# Patient Record
Sex: MALE | Race: WHITE | NOT HISPANIC OR LATINO | Employment: OTHER | URBAN - METROPOLITAN AREA
[De-identification: names, ages, dates, MRNs, and addresses within clinical notes are randomized per-mention and may not be internally consistent; named-entity substitution may affect disease eponyms.]

---

## 2017-10-09 ENCOUNTER — HOSPITAL ENCOUNTER (OUTPATIENT)
Facility: MEDICAL CENTER | Age: 52
End: 2017-10-09
Attending: SURGERY | Admitting: SURGERY
Payer: COMMERCIAL

## 2017-10-09 VITALS
SYSTOLIC BLOOD PRESSURE: 159 MMHG | BODY MASS INDEX: 37.78 KG/M2 | DIASTOLIC BLOOD PRESSURE: 71 MMHG | OXYGEN SATURATION: 99 % | HEART RATE: 72 BPM | WEIGHT: 285.06 LBS | RESPIRATION RATE: 17 BRPM | TEMPERATURE: 99 F | HEIGHT: 73 IN

## 2017-10-09 PROBLEM — N18.6 END STAGE RENAL DISEASE (HCC): Status: ACTIVE | Noted: 2017-10-09

## 2017-10-09 LAB
ANION GAP SERPL CALC-SCNC: 14 MMOL/L (ref 0–11.9)
BUN SERPL-MCNC: 80 MG/DL (ref 8–22)
CALCIUM SERPL-MCNC: 8.9 MG/DL (ref 8.5–10.5)
CHLORIDE SERPL-SCNC: 98 MMOL/L (ref 96–112)
CO2 SERPL-SCNC: 26 MMOL/L (ref 20–33)
CREAT SERPL-MCNC: 10.84 MG/DL (ref 0.5–1.4)
EKG IMPRESSION: NORMAL
ERYTHROCYTE [DISTWIDTH] IN BLOOD BY AUTOMATED COUNT: 51.4 FL (ref 35.9–50)
GFR SERPL CREATININE-BSD FRML MDRD: 5 ML/MIN/1.73 M 2
GLUCOSE BLD-MCNC: 189 MG/DL (ref 65–99)
GLUCOSE SERPL-MCNC: 173 MG/DL (ref 65–99)
HCT VFR BLD AUTO: 39.3 % (ref 42–52)
HGB BLD-MCNC: 13.4 G/DL (ref 14–18)
MCH RBC QN AUTO: 35 PG (ref 27–33)
MCHC RBC AUTO-ENTMCNC: 34.1 G/DL (ref 33.7–35.3)
MCV RBC AUTO: 102.6 FL (ref 81.4–97.8)
PLATELET # BLD AUTO: 162 K/UL (ref 164–446)
PMV BLD AUTO: 9.1 FL (ref 9–12.9)
POTASSIUM SERPL-SCNC: 5.1 MMOL/L (ref 3.6–5.5)
RBC # BLD AUTO: 3.83 M/UL (ref 4.7–6.1)
SODIUM SERPL-SCNC: 138 MMOL/L (ref 135–145)
WBC # BLD AUTO: 4.3 K/UL (ref 4.8–10.8)

## 2017-10-09 PROCEDURE — 93010 ELECTROCARDIOGRAM REPORT: CPT | Performed by: INTERNAL MEDICINE

## 2017-10-09 PROCEDURE — 501577 HCHG TROCAR, STEP 11MM: Performed by: SURGERY

## 2017-10-09 PROCEDURE — 82962 GLUCOSE BLOOD TEST: CPT

## 2017-10-09 PROCEDURE — A6402 STERILE GAUZE <= 16 SQ IN: HCPCS | Performed by: SURGERY

## 2017-10-09 PROCEDURE — A4300 CATH IMPL VASC ACCESS PORTAL: HCPCS | Performed by: SURGERY

## 2017-10-09 PROCEDURE — 93005 ELECTROCARDIOGRAM TRACING: CPT | Performed by: SURGERY

## 2017-10-09 PROCEDURE — 160036 HCHG PACU - EA ADDL 30 MINS PHASE I: Performed by: SURGERY

## 2017-10-09 PROCEDURE — 85027 COMPLETE CBC AUTOMATED: CPT

## 2017-10-09 PROCEDURE — 160025 RECOVERY II MINUTES (STATS): Performed by: SURGERY

## 2017-10-09 PROCEDURE — 501570 HCHG TROCAR, SEPARATOR: Performed by: SURGERY

## 2017-10-09 PROCEDURE — 160035 HCHG PACU - 1ST 60 MINS PHASE I: Performed by: SURGERY

## 2017-10-09 PROCEDURE — 700102 HCHG RX REV CODE 250 W/ 637 OVERRIDE(OP)

## 2017-10-09 PROCEDURE — 501327 HCHG SET, CAPD TRANSFER: Performed by: SURGERY

## 2017-10-09 PROCEDURE — 160047 HCHG PACU  - EA ADDL 30 MINS PHASE II: Performed by: SURGERY

## 2017-10-09 PROCEDURE — 160002 HCHG RECOVERY MINUTES (STAT): Performed by: SURGERY

## 2017-10-09 PROCEDURE — 160028 HCHG SURGERY MINUTES - 1ST 30 MINS LEVEL 3: Performed by: SURGERY

## 2017-10-09 PROCEDURE — 160046 HCHG PACU - 1ST 60 MINS PHASE II: Performed by: SURGERY

## 2017-10-09 PROCEDURE — 160048 HCHG OR STATISTICAL LEVEL 1-5: Performed by: SURGERY

## 2017-10-09 PROCEDURE — 501838 HCHG SUTURE GENERAL: Performed by: SURGERY

## 2017-10-09 PROCEDURE — 700111 HCHG RX REV CODE 636 W/ 250 OVERRIDE (IP)

## 2017-10-09 PROCEDURE — 80048 BASIC METABOLIC PNL TOTAL CA: CPT

## 2017-10-09 PROCEDURE — 160009 HCHG ANES TIME/MIN: Performed by: SURGERY

## 2017-10-09 PROCEDURE — A9270 NON-COVERED ITEM OR SERVICE: HCPCS

## 2017-10-09 PROCEDURE — 700101 HCHG RX REV CODE 250

## 2017-10-09 DEVICE — IMPLANTABLE DEVICE: Type: IMPLANTABLE DEVICE | Status: FUNCTIONAL

## 2017-10-09 RX ORDER — CARVEDILOL 12.5 MG/1
12.5 TABLET ORAL SEE ADMIN INSTRUCTIONS
COMMUNITY

## 2017-10-09 RX ORDER — GABAPENTIN 300 MG/1
300 CAPSULE ORAL DAILY
COMMUNITY

## 2017-10-09 RX ORDER — FUROSEMIDE 20 MG/1
20 TABLET ORAL 2 TIMES DAILY
COMMUNITY

## 2017-10-09 RX ORDER — FLUOXETINE HYDROCHLORIDE 40 MG/1
40 CAPSULE ORAL DAILY
COMMUNITY

## 2017-10-09 RX ORDER — INSULIN GLARGINE 100 [IU]/ML
48 INJECTION, SOLUTION SUBCUTANEOUS NIGHTLY
COMMUNITY

## 2017-10-09 RX ORDER — BUPIVACAINE HYDROCHLORIDE AND EPINEPHRINE 5; 5 MG/ML; UG/ML
INJECTION, SOLUTION EPIDURAL; INTRACAUDAL; PERINEURAL
Status: DISCONTINUED | OUTPATIENT
Start: 2017-10-09 | End: 2017-10-09 | Stop reason: HOSPADM

## 2017-10-09 RX ORDER — OXYCODONE HCL 5 MG/5 ML
SOLUTION, ORAL ORAL
Status: COMPLETED
Start: 2017-10-09 | End: 2017-10-09

## 2017-10-09 RX ORDER — LISINOPRIL 10 MG/1
10 TABLET ORAL SEE ADMIN INSTRUCTIONS
COMMUNITY

## 2017-10-09 RX ORDER — LIDOCAINE AND PRILOCAINE 25; 25 MG/G; MG/G
1 CREAM TOPICAL
Status: DISCONTINUED | OUTPATIENT
Start: 2017-10-09 | End: 2017-10-09 | Stop reason: HOSPADM

## 2017-10-09 RX ORDER — HYDROCODONE BITARTRATE AND ACETAMINOPHEN 5; 325 MG/1; MG/1
1-2 TABLET ORAL EVERY 4 HOURS PRN
Qty: 30 TAB | Refills: 0 | Status: SHIPPED | OUTPATIENT
Start: 2017-10-09

## 2017-10-09 RX ORDER — MAGNESIUM HYDROXIDE 1200 MG/15ML
LIQUID ORAL
Status: DISCONTINUED | OUTPATIENT
Start: 2017-10-09 | End: 2017-10-09 | Stop reason: HOSPADM

## 2017-10-09 RX ORDER — LORAZEPAM 1 MG/1
1 TABLET ORAL
COMMUNITY

## 2017-10-09 RX ORDER — LIDOCAINE HYDROCHLORIDE 10 MG/ML
0.5 INJECTION, SOLUTION INFILTRATION; PERINEURAL
Status: DISCONTINUED | OUTPATIENT
Start: 2017-10-09 | End: 2017-10-09 | Stop reason: HOSPADM

## 2017-10-09 RX ORDER — SODIUM CHLORIDE 9 MG/ML
1000 INJECTION, SOLUTION INTRAVENOUS
Status: DISCONTINUED | OUTPATIENT
Start: 2017-10-09 | End: 2017-10-09 | Stop reason: HOSPADM

## 2017-10-09 RX ADMIN — OXYCODONE HYDROCHLORIDE 1 MG: 5 SOLUTION ORAL at 09:40

## 2017-10-09 ASSESSMENT — PAIN SCALES - GENERAL
PAINLEVEL_OUTOF10: 5
PAINLEVEL_OUTOF10: 5
PAINLEVEL_OUTOF10: 6
PAINLEVEL_OUTOF10: 0
PAINLEVEL_OUTOF10: 6
PAINLEVEL_OUTOF10: 5
PAINLEVEL_OUTOF10: 6
PAINLEVEL_OUTOF10: 5
PAINLEVEL_OUTOF10: 0
PAINLEVEL_OUTOF10: 6
PAINLEVEL_OUTOF10: 0

## 2017-10-09 NOTE — OP REPORT
10/09/17    OPERATIVE NOTE    PRE-OPERATIVE DIAGNOSIS - Renal Failuire    POST-OPERATIVE DIAGNOSIS - Same    PROCEDURE PERFORMED - Laparoscopic Peritoneal Dialysis Catheter Placement     SURGEON - Ron Duong M.D.    ASSISTANT - PHILIP Pressley    TYPE OF ANESTHESIA - General     ANESTHESIOLOGIST  - Giovanny SLOAN - none    INDICATIONS - 52 y.o.      PROCEDURE IN DETAIL -the patient was taken to the operative suite placed in the supine position and adequate anesthesia the patient's abdomen was prepped and draped in sterile fashion. Half percent Marcaine with epinephrine was infiltrated into the subcutaneous tissue. Subcostal space. A small incision was made and a varies needle was inserted into the peritoneal cavity. CO2 was insufflated to a pressure of 15 mmHg and a 5 mm trocar was inserted. General inspection of the peritoneal cavity demonstrated no intra-abdominal adhesions. Half percent Marcaine was then infiltrated into the left mid abdomen and a small incision was made. A 11 mm trocar was tunneled subcutaneously and then entered into the peritoneal cavity oriented toward the pelvis. The 57 cm CAPD catheter was then laparoscopically advanced into the peritoneal cavity was properly positioned deep in the pelvis. The trocar was slowly withdrawn leaving the catheter in place. 4 Monocryl was then used to reapproximate the skin around the catheter. The 5 mm trocar was removed and that incision was closed using 4-0 Monocryl as well. Sterile dressings were applied and the catheter was flushed with dilute heparinized saline.      Ron Duong M.D.

## 2017-10-09 NOTE — DISCHARGE INSTRUCTIONS
ACTIVITY: Rest and take it easy for the first 24 hours.  A responsible adult is recommended to remain with you during that time.  It is normal to feel sleepy.  We encourage you to not do anything that requires balance, judgment or coordination.    MILD FLU-LIKE SYMPTOMS ARE NORMAL. YOU MAY EXPERIENCE GENERALIZED MUSCLE ACHES, THROAT IRRITATION, HEADACHE AND/OR SOME NAUSEA.    FOR 24 HOURS DO NOT:  Drive, operate machinery or run household appliances.  Drink beer or alcoholic beverages.   Make important decisions or sign legal documents.    SPECIAL INSTRUCTIONS: D/C instructions:     1. DIET: Upon discharge from the hospital you may resume your normal preoperative diet. Depending on how you are feeling and whether you have nausea or not, you may wish to stay with a bland diet for the first few days. However, you can advance this as quickly as you feel ready.     2. ACTIVITIES: After discharge from the hospital, you may resume full routine activities. However, there should be no heavy lifting (greater than 15 pounds) and no strenuous activities until after your follow-up visit. Otherwise, routine activities of daily living are acceptable.     3. DRIVING: You may drive whenever you are off pain medications and are able to perform the activities needed to drive, i.e. turning, bending, twisting, etc.     4. BATHING: You may get the wound wet at any time after leaving the hospital. You may shower, but do not submerge in a bath for at least a week. Dressings may come off after 48 hours. Leave steri strips on until they fall off.  It may be necessary to trim the edges.     5. BOWEL FUNCTION: Constipation is common after an operation, especially with pain medications. The combination of pain medication and decreased activity level can cause constipation in otherwise normal patients. If you feel this is occurring, take a laxative (Milk of Magnesia, Ex-Lax, Senokot, etc.) until the problem has resolved.     6. PAIN  MEDICATION: You will be given a prescription for pain medication at discharge. Please take these as directed. It is important to remember not to take medications on an empty stomach as this may cause nausea.     7.CALL IF YOU HAVE: (1) Fevers to more than 101.0 F, (2) Unusual chest or leg pain, (3) Drainage or fluid from incision that may be foul smelling, increased tenderness or soreness at the wound or the wound edges are no longer together, redness or swelling at the incision site. Please do not hesitate to call with any other questions.     8. APPOINTMENT: Contact [facility] for follow up     If you have any additional questions, please do not hesitate to call the office and speak to either myself or the physician on call.     Office address:   [address] [place], [place] [address]e [facility] [id number]   Ron Duong MD   [facility]   [phone]    DIET: To avoid nausea, slowly advance diet as tolerated, avoiding spicy or greasy foods for the first day.  Add more substantial food to your diet according to your physician's instructions.  Babies can be fed formula or breast milk as soon as they are hungry.  INCREASE FLUIDS AND FIBER TO AVOID CONSTIPATION.    SURGICAL DRESSING/BATHING: *see above*    FOLLOW-UP APPOINTMENT:  A follow-up appointment should be arranged with your doctor; call to schedule.    You should CALL YOUR PHYSICIAN if you develop:  Fever greater than 101 degrees F.  Pain not relieved by medication, or persistent nausea or vomiting.  Excessive bleeding (blood soaking through dressing) or unexpected drainage from the wound.  Extreme redness or swelling around the incision site, drainage of pus or foul smelling drainage.  Inability to urinate or empty your bladder within 8 hours.  Problems with breathing or chest pain.    You should call 911 if you develop problems with breathing or chest pain.  If you are unable to contact your doctor or surgical center, you should go to the nearest emergency room or urgent care  center.  Physician's telephone #: *Dr. Duong [phone]*    If any questions arise, call your doctor.  If your doctor is not available, please feel free to call the Surgical Center at [phone].  The Center is open Monday through Friday from 7AM to 7PM.  You can also call the HEALTH HOTLINE open 24 hours/day, 7 days/week and speak to a nurse at [phone], or toll free at [phone].    A registered nurse may call you a few days after your surgery to see how you are doing after your procedure.    MEDICATIONS: Resume taking daily medication.  Take prescribed pain medication with food.  If no medication is prescribed, you may take non-aspirin pain medication if needed.  PAIN MEDICATION CAN BE VERY CONSTIPATING.  Take a stool softener or laxative such as senokot, pericolace, or milk of magnesia if needed.    Prescription given *to wife *.  Last pain medication given at *410**.    If your physician has prescribed pain medication that includes Acetaminophen (Tylenol), do not take additional Acetaminophen (Tylenol) while taking the prescribed medication.    Depression / Suicide Risk    As you are discharged from this [facility] facility, it is important to learn how to keep safe from harming yourself.    Recognize the warning signs:  · Abrupt changes in personality, positive or negative- including increase in energy   · Giving away possessions  · Change in eating patterns- significant weight changes-  positive or negative  · Change in sleeping patterns- unable to sleep or sleeping all the time   · Unwillingness or inability to communicate  · Depression  · Unusual sadness, discouragement and loneliness  · Talk of wanting to die  · Neglect of personal appearance   · Rebelliousness- reckless behavior  · Withdrawal from people/activities they love  · Confusion- inability to concentrate     If you or a loved one observes any of these behaviors or has concerns about self-harm, here's what you can do:  · Talk  about it- your feelings and reasons for harming yourself  · Remove any means that you might use to hurt yourself (examples: pills, rope, extension cords, firearm)  · Get professional help from the community (Mental Health, Substance Abuse, psychological counseling)  · Do not be alone:Call your Safe Contact- someone whom you trust who will be there for you.  · Call your local CRISIS HOTLINE 521-5871 or 869-995-8921  · Call your local Children's Mobile Crisis Response Team [place] (520) 397-5536 or wwwSocialWire  · Call the toll free National Suicide Prevention Hotlines   · National Suicide Prevention Lifeline 364-900-RFDV (6392)  · National Hope Line Network 800-SUICIDE (600-4089)

## 2017-10-09 NOTE — H&P
"Date of Service:  10/9/2017      Dialysis Access History and Physical     PCP: Pcp Pt States None    CC:  Need for placement of CAPD    HPI: This is a 52 y.o. male with chronic kidney disease who needs CAPD placement. He is currently dialyzing through his left AVF and perm cath.    ROS: As above. The remainder of a complete review of systems is negative in all systems except as noted.    PMHx:  Active Ambulatory Problems     Diagnosis Date Noted   • No Active Ambulatory Problems     Resolved Ambulatory Problems     Diagnosis Date Noted   • No Resolved Ambulatory Problems     Past Medical History:   Diagnosis Date   • Diabetes (CMS-HCC)    • Dialysis patient (CMS-HCC)    • Hypertension    • Renal disorder        SHx:  Social History     Social History   • Marital status:      Spouse name: N/A   • Number of children: N/A   • Years of education: N/A     Occupational History   • Not on file.     Social History Main Topics   • Smoking status: Never Smoker   • Smokeless tobacco: Never Used   • Alcohol use No   • Drug use: No   • Sexual activity: Not on file     Other Topics Concern   • Not on file     Social History Narrative   • No narrative on file       FHx:  family history is not on file.    Allergies:  No Known Allergies    Medications:  No current facility-administered medications on file prior to encounter.      No current outpatient prescriptions on file prior to encounter.       Objective Exam:  Vitals:    10/09/17 0642 10/09/17 0719   BP:  159/71   Resp:  18   Temp:  36.9 °C (98.4 °F)   SpO2:  98%   Weight: (!) 129.3 kg (285 lb 0.9 oz)    Height: 1.854 m (6' 1\")          General: Awake, alert  HEENT: Normocephalic, atraumatic  Chest: Clear, right perm cath  CV: RRR  Abd: Soft  Neuro: Intact  Skin: Intact  Ext:  Left radiocephalic AVF       Laboratory--reviewed personally and are as follows:  Lab Results   Component Value Date/Time    WBC 4.3 (L) 10/09/2017 07:09 AM    RBC 3.83 (L) 10/09/2017 07:09 AM    " HEMOGLOBIN 13.4 (L) 10/09/2017 07:09 AM    HEMATOCRIT 39.3 (L) 10/09/2017 07:09 AM    .6 (H) 10/09/2017 07:09 AM    MCH 35.0 (H) 10/09/2017 07:09 AM    MCHC 34.1 10/09/2017 07:09 AM    MPV 9.1 10/09/2017 07:09 AM      Lab Results   Component Value Date/Time    SODIUM 138 10/09/2017 07:09 AM    POTASSIUM 5.1 10/09/2017 07:09 AM    CHLORIDE 98 10/09/2017 07:09 AM    CO2 26 10/09/2017 07:09 AM    GLUCOSE 173 (H) 10/09/2017 07:09 AM    BUN 80 (HH) 10/09/2017 07:09 AM    CREATININE 10.84 (HH) 10/09/2017 07:09 AM      No results found for: PROTHROMBTM, INR         MDM:  52 y.o. male here for placement of CAPD    Assessment/Plan:  Active Problems:    End stage renal disease (CMS-HCC) POA: Unknown  Resolved Problems:    * No resolved hospital problems. *      Planned Procedure :  CAPD placement.

## 2017-10-09 NOTE — OR NURSING
Pt A&O. VSS on RA. Pt reports pain tolerable and denies nausea. Kandis CDI.   Report called to PACU II and pt transferred.   RX with pts wife.

## (undated) DEVICE — SUCTION INSTRUMENT YANKAUER BULBOUS TIP W/O VENT (50EA/CA)

## (undated) DEVICE — SUTURE GENERAL

## (undated) DEVICE — HEAD HOLDER JUNIOR/ADULT

## (undated) DEVICE — MASK ANESTHESIA ADULT  - (100/CA)

## (undated) DEVICE — MINICAP EXTENDED LIFE TRANSFER SET (6EA/CA)

## (undated) DEVICE — BLADE SURGICAL #11 - (50/BX)

## (undated) DEVICE — SPONGE DRAIN 4 X 4IN 6-PLY - (2/PK25PK/BX12BX/CS)

## (undated) DEVICE — TROCAR 5X100 NON BLADED Z-TH - READ KII (6/BX)

## (undated) DEVICE — SLEEVE, VASO, THIGH, MED

## (undated) DEVICE — GLOVE BIOGEL SZ 8 SURGICAL PF LTX - (50PR/BX 4BX/CA)

## (undated) DEVICE — GLOVE BIOGEL SZ 6 PF LATEX - (50EA/BX 4BX/CA)

## (undated) DEVICE — KIT ANESTHESIA W/CIRCUIT & 3/LT BAG W/FILTER (20EA/CA)

## (undated) DEVICE — CHLORAPREP 26 ML APPLICATOR - ORANGE TINT(25/CA)

## (undated) DEVICE — LACTATED RINGERS INJ 1000 ML - (14EA/CA 60CA/PF)

## (undated) DEVICE — SET EXTENSION WITH 2 PORTS (48EA/CA) ***PART #2C8610 IS A SUBSTITUTE*****

## (undated) DEVICE — SET LEADWIRE 5 LEAD BEDSIDE DISPOSABLE ECG (1SET OF 5/EA)

## (undated) DEVICE — GLOVE BIOGEL PI INDICATOR SZ 7.0 SURGICAL PF LF - (50/BX 4BX/CA)

## (undated) DEVICE — NEPTUNE 4 PORT MANIFOLD - (20/PK)

## (undated) DEVICE — GOWN WARMING STANDARD FLEX - (30/CA)

## (undated) DEVICE — GOWN SURGEONS LARGE - (32/CA)

## (undated) DEVICE — TROCAR Z THREAD11MM OPTICAL - NON BLADED(6/BX)

## (undated) DEVICE — SUTURE 4-0 MONOCRYL PLUS PS-2 - 27 INCH (36/BX)

## (undated) DEVICE — ELECTRODE DUAL RETURN W/ CORD - (50/PK)

## (undated) DEVICE — SENSOR SPO2 NEO LNCS ADHESIVE (20/BX) SEE USER NOTES

## (undated) DEVICE — CANISTER SUCTION 3000ML MECHANICAL FILTER AUTO SHUTOFF MEDI-VAC NONSTERILE LF DISP  (40EA/CA)

## (undated) DEVICE — TUBE E-T HI-LO CUFF 7.5MM (10EA/PK)

## (undated) DEVICE — KIT ROOM DECONTAMINATION

## (undated) DEVICE — PROTECTOR ULNA NERVE - (36PR/CA)

## (undated) DEVICE — ELECTRODE 850 FOAM ADHESIVE - HYDROGEL RADIOTRNSPRNT (50/PK)

## (undated) DEVICE — TUBING INSUFFLATION - (10/BX)

## (undated) DEVICE — TUBING CLEARLINK DUO-VENT - C-FLO (48EA/CA)

## (undated) DEVICE — PACK MINOR BASIN - (2EA/CA)

## (undated) DEVICE — DRAPE LAPAROTOMY T SHEET - (12EA/CA)

## (undated) DEVICE — GLOVE BIOGEL SZ 7 SURGICAL PF LTX - (50PR/BX 4BX/CA)